# Patient Record
Sex: FEMALE | Race: WHITE | URBAN - NONMETROPOLITAN AREA
[De-identification: names, ages, dates, MRNs, and addresses within clinical notes are randomized per-mention and may not be internally consistent; named-entity substitution may affect disease eponyms.]

---

## 2019-05-06 ENCOUNTER — IMPORTED ENCOUNTER (OUTPATIENT)
Dept: URBAN - NONMETROPOLITAN AREA CLINIC 1 | Facility: CLINIC | Age: 84
End: 2019-05-06

## 2019-05-06 PROBLEM — H53.19: Noted: 2019-05-06

## 2019-05-06 PROBLEM — H35.371: Noted: 2019-05-06

## 2019-05-06 PROBLEM — Z96.1: Noted: 2019-05-06

## 2019-05-06 PROBLEM — E11.9: Noted: 2019-05-06

## 2019-05-06 PROBLEM — H25.812: Noted: 2019-05-06

## 2019-05-06 PROBLEM — H43.812: Noted: 2019-05-06

## 2019-05-06 PROCEDURE — 99214 OFFICE O/P EST MOD 30 MIN: CPT

## 2019-05-06 PROCEDURE — 92134 CPTRZ OPH DX IMG PST SGM RTA: CPT

## 2019-05-06 NOTE — PATIENT DISCUSSION
Sudden Painless Loss of VA OD - OCT unchanged from previous studies as far back as 2014  and nothing seen clinically to explain decrease in patients VA OD.- Recommend further evaluation with Dr. Angelica Pappas following  and ESR/CRP blood work. - No clinical temporal pain or palpable vesselS/P Vitrectomy OD with macular hole OD- Appears stable on today's exam. IDDM sans Retinopathy - Stressed the importance of keeping blood sugars under control blood pressure under control and weight normalization and regular visits with PCP.- Explained the possible effects of poorly controlled diabetes and the damage that diabetes can cause to ocular health. - Photos taken today: stable from last years photos no diabetic retinopathy noted on today's exam- Patient to check HgbA1C.- Pt instructed to contact our office with any vision changes. 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 - Reviewed symptoms of advancing cataract growth such as glare and halos and decreased vision.- Continue to monitor for now. Pt will notify us if any new symptoms develop. Pseudophakia OD - Intraocular lens is stable and in place - Continue to monitor S/P Vitrectomy OD - Appears stable on today's exam. PVD OS- Discussed findings of exam in detail with the patient. - The risk of retinal detachment in patients with PVDs was discussed with the patient and the warning signs of retinal detachment were carefully reviewed with the patient. - The patient was warned to return to the office or contact the ophthalmologist on call immediately if they experience signs of retinal detachment.; 's Notes: MR 10/8/18DFE 10/8/18Photos 10/8/18

## 2019-05-07 ENCOUNTER — IMPORTED ENCOUNTER (OUTPATIENT)
Dept: URBAN - NONMETROPOLITAN AREA CLINIC 1 | Facility: CLINIC | Age: 84
End: 2019-05-07

## 2019-05-07 PROCEDURE — 92083 EXTENDED VISUAL FIELD XM: CPT

## 2019-05-07 NOTE — PATIENT DISCUSSION
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 José Luis Lindsey for possible progression of pseudohole OD.-----------------Notes below from previous visit----------------------Sudden Painless Loss of VA OD - OCT unchanged from previous studies as far back as 2014  and nothing seen clinically to explain decrease in patients VA OD.- Recommend further evaluation with Dr. José Luis Lindsey following  and ESR/CRP blood work. - No clinical temporal pain or palpable vesselS/P Vitrectomy OD with macular hole OD- Appears stable on today's exam. IDDM sans Retinopathy - Stressed the importance of keeping blood sugars under control blood pressure under control and weight normalization and regular visits with PCP.- Explained the possible effects of poorly controlled diabetes and the damage that diabetes can cause to ocular health. - Photos taken today: stable from last years photos no diabetic retinopathy noted on today's exam- Patient to check HgbA1C.- Pt instructed to contact our office with any vision changes. 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 - Reviewed symptoms of advancing cataract growth such as glare and halos and decreased vision.- Continue to monitor for now. Pt will notify us if any new symptoms develop. Pseudophakia OD - Intraocular lens is stable and in place - Continue to monitor S/P Vitrectomy OD - Appears stable on today's exam. PVD OS- Discussed findings of exam in detail with the patient. - The risk of retinal detachment in patients with PVDs was discussed with the patient and the warning signs of retinal detachment were carefully reviewed with the patient. - The patient was warned to return to the office or contact the ophthalmologist on call immediately if they experience signs of retinal detachment.; 's Notes:  10/8/18DFE 10/8/18Photos 10/8/18

## 2022-04-15 ASSESSMENT — VISUAL ACUITY
OS_SC: 20/40-2
OS_PH: 20/40
OD_SC: CF4'

## 2022-04-15 ASSESSMENT — TONOMETRY
OD_IOP_MMHG: 12
OS_IOP_MMHG: 14